# Patient Record
Sex: MALE | Race: WHITE | NOT HISPANIC OR LATINO | Employment: FULL TIME | URBAN - NONMETROPOLITAN AREA
[De-identification: names, ages, dates, MRNs, and addresses within clinical notes are randomized per-mention and may not be internally consistent; named-entity substitution may affect disease eponyms.]

---

## 2018-03-12 ENCOUNTER — APPOINTMENT (OUTPATIENT)
Dept: LAB | Facility: HOSPITAL | Age: 23
End: 2018-03-12

## 2018-03-12 PROCEDURE — 80053 COMPREHEN METABOLIC PANEL: CPT | Performed by: EMERGENCY MEDICINE

## 2018-03-12 PROCEDURE — 85025 COMPLETE CBC W/AUTO DIFF WBC: CPT | Performed by: EMERGENCY MEDICINE

## 2018-03-12 PROCEDURE — 86677 HELICOBACTER PYLORI ANTIBODY: CPT | Performed by: EMERGENCY MEDICINE

## 2018-03-13 ENCOUNTER — OFFICE VISIT (OUTPATIENT)
Dept: GASTROENTEROLOGY | Facility: CLINIC | Age: 23
End: 2018-03-13

## 2018-03-13 VITALS
HEART RATE: 50 BPM | BODY MASS INDEX: 22 KG/M2 | HEIGHT: 72 IN | DIASTOLIC BLOOD PRESSURE: 76 MMHG | SYSTOLIC BLOOD PRESSURE: 126 MMHG | WEIGHT: 162.4 LBS

## 2018-03-13 DIAGNOSIS — R10.13 EPIGASTRIC PAIN: Primary | ICD-10-CM

## 2018-03-13 DIAGNOSIS — R63.4 WEIGHT LOSS, ABNORMAL: ICD-10-CM

## 2018-03-13 PROCEDURE — 99214 OFFICE O/P EST MOD 30 MIN: CPT | Performed by: NURSE PRACTITIONER

## 2018-03-13 RX ORDER — SUCRALFATE 1 G/1
1 TABLET ORAL 4 TIMES DAILY
Qty: 120 TABLET | Refills: 3 | Status: SHIPPED | OUTPATIENT
Start: 2018-03-13 | End: 2018-03-19

## 2018-03-13 RX ORDER — DEXTROSE AND SODIUM CHLORIDE 5; .45 G/100ML; G/100ML
30 INJECTION, SOLUTION INTRAVENOUS CONTINUOUS PRN
Status: CANCELLED | OUTPATIENT
Start: 2018-03-15

## 2018-03-13 NOTE — PROGRESS NOTES
Chief Complaint   Patient presents with   • Abdominal Pain       Subjective    Aldair Rivera is a 22 y.o. male. he is here today for Urgent care follow-up.    Abdominal Pain   This is a new problem. The current episode started more than 1 month ago (one year). The problem occurs daily. The pain is severe. The quality of the pain is cramping and burning. The abdominal pain radiates to the epigastric region. Pertinent negatives include no arthralgias, constipation, diarrhea, fever, nausea or vomiting. The pain is aggravated by eating.     22-year-old male presents to discuss severe epigastric abdominal pain that is been ongoing for the last year.  States he has severe pain anytime he eats no matter what intake is.  He reports he has an appetite and feels hungry however will not eat due to pain associated with eating.  He has lost 40 pounds in the last year.  He denies any nausea, vomiting.  Denies any changes in his bowel habits or blood in the stool.  Denies any family history of colorectal cancer or polyps.  He was seen at urgent care yesterday per Dr. Lacey CBC and CMP were within normal limits and Prilosec was started.  Plan; we'll order ultrasound of abdomen.  Schedule patient for urgent EGD due to severe epigastric pain with eating.  He was started on Prilosec yesterday encouraged him to continue that medication and will add Carafate before meals and at bedtime.  Avoid all gastric irritants.       The following portions of the patient's history were reviewed and updated as appropriate:   Past Medical History:   Diagnosis Date   • Backache    • Neck pain      Past Surgical History:   Procedure Laterality Date   • DENTAL PROCEDURE     • INJECTION OF MEDICATION  04/07/2016    Toradol (1)        Family History   Problem Relation Age of Onset   • Heart disease Mother    • Hypertension Mother    • Stroke Maternal Aunt    • Diabetes Maternal Grandmother        Current Outpatient Prescriptions   Medication Sig Dispense  "Refill   • omeprazole (priLOSEC) 40 MG capsule Take 1 capsule by mouth Daily for 30 days. 30 capsule 0   • sucralfate (CARAFATE) 1 g tablet Take 1 tablet by mouth 4 (Four) Times a Day. 120 tablet 3     No current facility-administered medications for this visit.      Allergies   Allergen Reactions   • Zithromax [Azithromycin] Swelling     Social History     Social History   • Marital status: Single     Social History Main Topics   • Smoking status: Never Smoker   • Smokeless tobacco: Current User     Types: Chew   • Alcohol use No   • Drug use: No   • Sexual activity: Defer     Other Topics Concern   • Not on file       Review of Systems  Review of Systems   Constitutional: Negative for activity change, appetite change, chills, diaphoresis, fatigue, fever and unexpected weight change.   HENT: Negative for sore throat and trouble swallowing.    Respiratory: Negative for shortness of breath.    Gastrointestinal: Positive for abdominal pain. Negative for abdominal distention, anal bleeding, blood in stool, constipation, diarrhea, nausea, rectal pain and vomiting.   Musculoskeletal: Negative for arthralgias.   Skin: Negative for pallor.   Neurological: Negative for light-headedness.        /76   Pulse 50   Ht 182.9 cm (72.01\")   Wt 73.7 kg (162 lb 6.4 oz)   BMI 22.02 kg/m²     Objective    Physical Exam   Constitutional: He is oriented to person, place, and time. He appears well-developed and well-nourished. He is cooperative. No distress.   HENT:   Head: Normocephalic and atraumatic.   Neck: Normal range of motion. Neck supple. No thyromegaly present.   Cardiovascular: Normal rate, regular rhythm and normal heart sounds.    Pulmonary/Chest: Effort normal and breath sounds normal. He has no wheezes. He has no rhonchi. He has no rales.   Abdominal: Soft. Normal appearance and bowel sounds are normal. He exhibits no shifting dullness and no distension. There is no hepatosplenomegaly. There is tenderness in the " epigastric area. There is no rigidity and no guarding. No hernia.   Lymphadenopathy:     He has no cervical adenopathy.   Neurological: He is alert and oriented to person, place, and time.   Skin: Skin is warm, dry and intact. No rash noted. No pallor.   Psychiatric: He has a normal mood and affect. His speech is normal.     Admission on 03/12/2018, Discharged on 03/12/2018   Component Date Value Ref Range Status   • Glucose 03/12/2018 85  60 - 100 mg/dL Final   • BUN 03/12/2018 9  7 - 21 mg/dL Final   • Creatinine 03/12/2018 0.81  0.70 - 1.30 mg/dL Final   • Sodium 03/12/2018 141  137 - 145 mmol/L Final   • Potassium 03/12/2018 4.5  3.5 - 5.1 mmol/L Final   • Chloride 03/12/2018 99  95 - 110 mmol/L Final   • CO2 03/12/2018 29.0  22.0 - 31.0 mmol/L Final   • Calcium 03/12/2018 10.0  8.4 - 10.2 mg/dL Final   • Total Protein 03/12/2018 7.9  6.3 - 8.6 g/dL Final   • Albumin 03/12/2018 4.70  3.40 - 4.80 g/dL Final   • ALT (SGPT) 03/12/2018 46  21 - 72 U/L Final   • AST (SGOT) 03/12/2018 27  17 - 59 U/L Final   • Alkaline Phosphatase 03/12/2018 69  38 - 126 U/L Final   • Total Bilirubin 03/12/2018 0.6  0.2 - 1.3 mg/dL Final   • eGFR Non African Amer 03/12/2018 120  >60 mL/min/1.73 Final   • Globulin 03/12/2018 3.2  2.3 - 3.5 gm/dL Final   • A/G Ratio 03/12/2018 1.5  1.1 - 1.8 g/dL Final   • BUN/Creatinine Ratio 03/12/2018 11.1  7.0 - 25.0 Final   • Anion Gap 03/12/2018 13.0  5.0 - 15.0 mmol/L Final   • H. pylori IgG 03/13/2018 0.16  0.00 - 0.79 Index Value Final   • H. pylori, IgA ABS 03/13/2018 <9.0  0.0 - 8.9 units Final   • H. Pylori, IgM 03/13/2018 <9.0  0.0 - 8.9 units Final   • WBC 03/12/2018 4.88  3.20 - 9.80 10*3/mm3 Final   • RBC 03/12/2018 5.17  4.37 - 5.74 10*6/mm3 Final   • Hemoglobin 03/12/2018 15.3  13.7 - 17.3 g/dL Final   • Hematocrit 03/12/2018 43.5  39.0 - 49.0 % Final   • MCV 03/12/2018 84.1  80.0 - 98.0 fL Final   • MCH 03/12/2018 29.6  26.5 - 34.0 pg Final   • MCHC 03/12/2018 35.2  31.5 - 36.3 g/dL  Final   • RDW 03/12/2018 12.0  11.5 - 14.5 % Final   • RDW-SD 03/12/2018 37.0  35.1 - 43.9 fl Final   • MPV 03/12/2018 9.9  8.0 - 12.0 fL Final   • Platelets 03/12/2018 265  150 - 450 10*3/mm3 Final   • Neutrophil % 03/12/2018 46.3  37.0 - 80.0 % Final   • Lymphocyte % 03/12/2018 44.1  10.0 - 50.0 % Final   • Monocyte % 03/12/2018 7.4  0.0 - 12.0 % Final   • Eosinophil % 03/12/2018 1.6  0.0 - 7.0 % Final   • Basophil % 03/12/2018 0.6  0.0 - 2.0 % Final   • Immature Grans % 03/12/2018 0.0  0.0 - 0.5 % Final   • Neutrophils, Absolute 03/12/2018 2.26  2.00 - 8.60 10*3/mm3 Final   • Lymphocytes, Absolute 03/12/2018 2.15  0.60 - 4.20 10*3/mm3 Final   • Monocytes, Absolute 03/12/2018 0.36  0.00 - 0.90 10*3/mm3 Final   • Eosinophils, Absolute 03/12/2018 0.08  0.00 - 0.70 10*3/mm3 Final   • Basophils, Absolute 03/12/2018 0.03  0.00 - 0.20 10*3/mm3 Final   • Immature Grans, Absolute 03/12/2018 0.00  0.00 - 0.02 10*3/mm3 Final     Assessment/Plan      1. Epigastric pain    2. Weight loss, abnormal    .       Orders placed during this encounter include:  Orders Placed This Encounter   Procedures   • US Gallbladder     Order Specific Question:   Reason for Exam:     Answer:   abd pain weight loss       ESOPHAGOGASTRODUODENOSCOPY possible dilation (N/A)    Review and/or summary of lab tests, radiology, procedures, medications. Review and summary of old records and obtaining of history. The risks and benefits of my recommendations, as well as other treatment options were discussed with the patient today. Questions were answered.    New Medications Ordered This Visit   Medications   • sucralfate (CARAFATE) 1 g tablet     Sig: Take 1 tablet by mouth 4 (Four) Times a Day.     Dispense:  120 tablet     Refill:  3       Follow-up: Return in about 4 weeks (around 4/10/2018).          This document has been electronically signed by MERLINE Nelson on March 13, 2018 4:41 PM             Results for orders placed or performed during the  hospital encounter of 03/12/18   Helicobacter Pylori, IgA IgG IgM   Result Value Ref Range    H. pylori IgG 0.16 0.00 - 0.79 Index Value    H. pylori, IgA ABS <9.0 0.0 - 8.9 units    H. Pylori, IgM <9.0 0.0 - 8.9 units   CBC Auto Differential   Result Value Ref Range    WBC 4.88 3.20 - 9.80 10*3/mm3    RBC 5.17 4.37 - 5.74 10*6/mm3    Hemoglobin 15.3 13.7 - 17.3 g/dL    Hematocrit 43.5 39.0 - 49.0 %    MCV 84.1 80.0 - 98.0 fL    MCH 29.6 26.5 - 34.0 pg    MCHC 35.2 31.5 - 36.3 g/dL    RDW 12.0 11.5 - 14.5 %    RDW-SD 37.0 35.1 - 43.9 fl    MPV 9.9 8.0 - 12.0 fL    Platelets 265 150 - 450 10*3/mm3    Neutrophil % 46.3 37.0 - 80.0 %    Lymphocyte % 44.1 10.0 - 50.0 %    Monocyte % 7.4 0.0 - 12.0 %    Eosinophil % 1.6 0.0 - 7.0 %    Basophil % 0.6 0.0 - 2.0 %    Immature Grans % 0.0 0.0 - 0.5 %    Neutrophils, Absolute 2.26 2.00 - 8.60 10*3/mm3    Lymphocytes, Absolute 2.15 0.60 - 4.20 10*3/mm3    Monocytes, Absolute 0.36 0.00 - 0.90 10*3/mm3    Eosinophils, Absolute 0.08 0.00 - 0.70 10*3/mm3    Basophils, Absolute 0.03 0.00 - 0.20 10*3/mm3    Immature Grans, Absolute 0.00 0.00 - 0.02 10*3/mm3   Comprehensive Metabolic Panel   Result Value Ref Range    Glucose 85 60 - 100 mg/dL    BUN 9 7 - 21 mg/dL    Creatinine 0.81 0.70 - 1.30 mg/dL    Sodium 141 137 - 145 mmol/L    Potassium 4.5 3.5 - 5.1 mmol/L    Chloride 99 95 - 110 mmol/L    CO2 29.0 22.0 - 31.0 mmol/L    Calcium 10.0 8.4 - 10.2 mg/dL    Total Protein 7.9 6.3 - 8.6 g/dL    Albumin 4.70 3.40 - 4.80 g/dL    ALT (SGPT) 46 21 - 72 U/L    AST (SGOT) 27 17 - 59 U/L    Alkaline Phosphatase 69 38 - 126 U/L    Total Bilirubin 0.6 0.2 - 1.3 mg/dL    eGFR Non African Amer 120 >60 mL/min/1.73    Globulin 3.2 2.3 - 3.5 gm/dL    A/G Ratio 1.5 1.1 - 1.8 g/dL    BUN/Creatinine Ratio 11.1 7.0 - 25.0    Anion Gap 13.0 5.0 - 15.0 mmol/L   Results for orders placed or performed during the hospital encounter of 01/16/17   Urinalysis   Result Value Ref Range    Color, UA YELLOW  STRAW,YELLOW,DK YELLOW,JUANI    Appearance CLOUDY (H) CLEAR    Specific Gravity, UA 1.011 1.003 - 1.030    pH, UA 7.5 5.0 - 9.0 pH Units    Leukocytes, UA NEGATIVE NEGATIVE    Nitrite, UA NEGATIVE NEGATIVE    Protein, UA NEGATIVE NEGATIVE    Glucose, Urine NEGATIVE NEGATIVE mg/dl    Ketones, UA NEGATIVE NEGATIVE    Urobilinogen, UA 0.2 0.2 EU/dl    Blood, UA NEGATIVE NEGATIVE   CBC & Differential   Result Value Ref Range    WBC 12.6 (H) 3.2 - 9.8 x1000/uL    RBC 5.23 4.37 - 5.74 jayla/mm3    Hemoglobin 15.4 13.7 - 17.3 gm/dl    Hematocrit 42.9 39.0 - 49.0 %    MCV 82.0 80.0 - 98.0 fl    MCH 29.4 26.0 - 34.0 pg    MCHC 35.9 31.5 - 36.3 gm/dl    RDW 12.0 11.5 - 14.5 %    Platelets 319 150 - 450 x1000/mm3    MPV 9.1 8.0 - 12.0 fl    Neutrophil Rel % 76.7 37.0 - 80.0 %    Lymphocyte Rel % 16.8 10.0 - 50.0 %    Monocyte Rel % 5.5 0.0 - 12.0 %    Eosinophil Rel % 0.6 0.0 - 7.0 %    Basophil Rel % 0.2 0.0 - 2.0 %    Immature Granulocyte Rel % 0.20 0.00 - 0.50 %    Neutrophils Absolute 9.69 (H) 2.00 - 8.60 x1000/uL    Lymphocytes Absolute 2.12 0.60 - 4.20 x1000/uL    Monocytes Absolute 0.69 0.00 - 0.90 x1000/uL    Eosinophils Absolute 0.07 0.00 - 0.70 x1000/uL    Basophils Absolute 0.02 0.00 - 0.20 x1000/uL    Immature Granulocytes Absolute 0.030 (H) 0.005 - 0.022 x1000/uL    nRBC 0.0 0.0 - 0.2 %    nRBC 0.000 x1000/uL   CK   Result Value Ref Range    Creatine Kinase 61 55 - 170 U/L   Comprehensive Metabolic Panel   Result Value Ref Range    Sodium 138 137 - 145 mmol/L    Potassium 3.8 3.5 - 5.1 mmol/L    Chloride 100 95 - 110 mmol/L    CO2 25 22 - 31 mmol/L    Anion Gap 13.0 5.0 - 15.0 mmol/L    Glucose 96 60 - 100 mg/dl    BUN 9 7 - 21 mg/dl    Creatinine 0.8 0.7 - 1.3 mg/dl    GFR MDRD Non  123 77 - 179 mL/min/1.73 sq.M    GFR MDRD  149 77 - 179 mL/min/1.73 sq.M    Calcium 9.6 8.4 - 10.2 mg/dl    Total Protein 7.5 6.3 - 8.6 gm/dl    Albumin 4.3 3.4 - 4.8 gm/dl    Total Bilirubin 0.5 0.2 -  1.3 mg/dl    Alkaline Phosphatase 77 38 - 126 U/L    AST (SGOT) 21 17 - 59 U/L    ALT (SGPT) 42 21 - 72 U/L   Results for orders placed or performed during the hospital encounter of 01/14/17   proBNP   Result Value Ref Range    NT-proBNP 29 0 - 450 pg/ml   CK-MB   Result Value Ref Range    CKMB <0.2 0.0 - 5.0 ng/ml     *Note: Due to a large number of results and/or encounters for the requested time period, some results have not been displayed. A complete set of results can be found in Results Review.

## 2018-03-14 ENCOUNTER — TELEPHONE (OUTPATIENT)
Dept: GASTROENTEROLOGY | Facility: CLINIC | Age: 23
End: 2018-03-14

## 2018-03-14 NOTE — TELEPHONE ENCOUNTER
Attempted to call patients' mother back voicemail was not set up at the moment therefore I couldn't leave a message.

## 2018-03-14 NOTE — TELEPHONE ENCOUNTER
----- Message from Sissy Montejo MA sent at 3/14/2018  1:46 PM CDT -----  Contact: [phone]  Queenie Rivera patients mother called stating that the Rx that was sent in yesterday is not available at the pharmacy.

## 2018-03-15 ENCOUNTER — ANESTHESIA EVENT (OUTPATIENT)
Dept: GASTROENTEROLOGY | Facility: HOSPITAL | Age: 23
End: 2018-03-15

## 2018-03-15 ENCOUNTER — ANESTHESIA (OUTPATIENT)
Dept: GASTROENTEROLOGY | Facility: HOSPITAL | Age: 23
End: 2018-03-15

## 2018-03-15 ENCOUNTER — HOSPITAL ENCOUNTER (OUTPATIENT)
Facility: HOSPITAL | Age: 23
Setting detail: HOSPITAL OUTPATIENT SURGERY
Discharge: HOME | End: 2018-03-15
Attending: INTERNAL MEDICINE | Admitting: INTERNAL MEDICINE

## 2018-03-15 VITALS
DIASTOLIC BLOOD PRESSURE: 62 MMHG | HEIGHT: 72 IN | SYSTOLIC BLOOD PRESSURE: 117 MMHG | BODY MASS INDEX: 21.81 KG/M2 | TEMPERATURE: 97.7 F | WEIGHT: 161 LBS | HEART RATE: 70 BPM | RESPIRATION RATE: 17 BRPM | OXYGEN SATURATION: 99 %

## 2018-03-15 DIAGNOSIS — R10.13 EPIGASTRIC PAIN: ICD-10-CM

## 2018-03-15 DIAGNOSIS — R63.4 WEIGHT LOSS, ABNORMAL: ICD-10-CM

## 2018-03-15 PROCEDURE — 43239 EGD BIOPSY SINGLE/MULTIPLE: CPT | Performed by: INTERNAL MEDICINE

## 2018-03-15 PROCEDURE — 88305 TISSUE EXAM BY PATHOLOGIST: CPT | Performed by: PATHOLOGY

## 2018-03-15 PROCEDURE — 88305 TISSUE EXAM BY PATHOLOGIST: CPT | Performed by: INTERNAL MEDICINE

## 2018-03-15 PROCEDURE — 25010000002 PROPOFOL 10 MG/ML EMULSION: Performed by: NURSE ANESTHETIST, CERTIFIED REGISTERED

## 2018-03-15 RX ORDER — DEXTROSE AND SODIUM CHLORIDE 5; .45 G/100ML; G/100ML
30 INJECTION, SOLUTION INTRAVENOUS CONTINUOUS PRN
Status: DISCONTINUED | OUTPATIENT
Start: 2018-03-15 | End: 2018-03-15 | Stop reason: HOSPADM

## 2018-03-15 RX ORDER — PROPOFOL 10 MG/ML
VIAL (ML) INTRAVENOUS AS NEEDED
Status: DISCONTINUED | OUTPATIENT
Start: 2018-03-15 | End: 2018-03-15 | Stop reason: SURG

## 2018-03-15 RX ORDER — ONDANSETRON 2 MG/ML
4 INJECTION INTRAMUSCULAR; INTRAVENOUS ONCE AS NEEDED
Status: DISCONTINUED | OUTPATIENT
Start: 2018-03-15 | End: 2018-03-15 | Stop reason: HOSPADM

## 2018-03-15 RX ORDER — DEXAMETHASONE SODIUM PHOSPHATE 4 MG/ML
8 INJECTION, SOLUTION INTRA-ARTICULAR; INTRALESIONAL; INTRAMUSCULAR; INTRAVENOUS; SOFT TISSUE ONCE AS NEEDED
Status: DISCONTINUED | OUTPATIENT
Start: 2018-03-15 | End: 2018-03-15 | Stop reason: HOSPADM

## 2018-03-15 RX ORDER — PROMETHAZINE HYDROCHLORIDE 25 MG/1
25 TABLET ORAL ONCE AS NEEDED
Status: DISCONTINUED | OUTPATIENT
Start: 2018-03-15 | End: 2018-03-15 | Stop reason: HOSPADM

## 2018-03-15 RX ORDER — LIDOCAINE HYDROCHLORIDE 10 MG/ML
INJECTION, SOLUTION INFILTRATION; PERINEURAL AS NEEDED
Status: DISCONTINUED | OUTPATIENT
Start: 2018-03-15 | End: 2018-03-15 | Stop reason: SURG

## 2018-03-15 RX ORDER — PROMETHAZINE HYDROCHLORIDE 25 MG/ML
12.5 INJECTION, SOLUTION INTRAMUSCULAR; INTRAVENOUS ONCE AS NEEDED
Status: DISCONTINUED | OUTPATIENT
Start: 2018-03-15 | End: 2018-03-15 | Stop reason: HOSPADM

## 2018-03-15 RX ORDER — PROMETHAZINE HYDROCHLORIDE 25 MG/1
25 SUPPOSITORY RECTAL ONCE AS NEEDED
Status: DISCONTINUED | OUTPATIENT
Start: 2018-03-15 | End: 2018-03-15 | Stop reason: HOSPADM

## 2018-03-15 RX ADMIN — PROPOFOL 50 MG: 10 INJECTION, EMULSION INTRAVENOUS at 18:06

## 2018-03-15 RX ADMIN — DEXTROSE AND SODIUM CHLORIDE 30 ML/HR: 5; 450 INJECTION, SOLUTION INTRAVENOUS at 16:32

## 2018-03-15 RX ADMIN — LIDOCAINE HYDROCHLORIDE 50 MG: 10 INJECTION, SOLUTION INFILTRATION; PERINEURAL at 17:55

## 2018-03-15 RX ADMIN — PROPOFOL 150 MG: 10 INJECTION, EMULSION INTRAVENOUS at 17:58

## 2018-03-15 NOTE — ANESTHESIA POSTPROCEDURE EVALUATION
Patient: Aldair Rivera    Procedure Summary     Date:  03/15/18 Room / Location:  [facility] ENDOSCOPY 2 / [facility] ENDOSCOPY    Anesthesia Start:  1756 Anesthesia Stop:  1810    Procedure:  ESOPHAGOGASTRODUODENOSCOPY possible dilation (N/A Esophagus) Diagnosis:       Epigastric pain      Weight loss, abnormal      (Epigastric pain [R10.13])      (Weight loss, abnormal [R63.4])    Surgeon:  Roman Boyle MD Provider:  Jacinto South CRNA    Anesthesia Type:  MAC ASA Status:  1          Anesthesia Type: MAC  Last vitals  BP   134/63 (03/15/18 1626)   Temp   97.6 °F (36.4 °C) (03/15/18 1626)   Pulse   69 (03/15/18 1626)   Resp   18 (03/15/18 1626)     SpO2   98 % (03/15/18 1626)     Post Anesthesia Care and Evaluation    Patient location during evaluation: bedside  Patient participation: complete - patient participated  Level of consciousness: awake  Pain score: 1  Pain management: adequate  Airway patency: patent  Anesthetic complications: No anesthetic complications  PONV Status: none  Cardiovascular status: acceptable  Hydration status: acceptable

## 2018-03-15 NOTE — ANESTHESIA PREPROCEDURE EVALUATION
Anesthesia Evaluation     Patient summary reviewed and Nursing notes reviewed   NPO Solid Status: > 8 hours  NPO Liquid Status: > 8 hours           Airway   No difficulty expected  Dental      Pulmonary    Cardiovascular         Neuro/Psych  GI/Hepatic/Renal/Endo      Musculoskeletal     (+) neck pain,   Abdominal    Substance History      OB/GYN          Other                        Anesthesia Plan    ASA 1     MAC     intravenous induction   Anesthetic plan and risks discussed with patient.    Plan discussed with CRNA.

## 2018-03-19 ENCOUNTER — HOSPITAL ENCOUNTER (OUTPATIENT)
Dept: ULTRASOUND IMAGING | Facility: HOSPITAL | Age: 23
Discharge: HOME | End: 2018-03-19
Admitting: NURSE PRACTITIONER

## 2018-03-19 ENCOUNTER — LAB (OUTPATIENT)
Dept: LAB | Facility: HOSPITAL | Age: 23
End: 2018-03-19

## 2018-03-19 ENCOUNTER — TELEPHONE (OUTPATIENT)
Dept: GASTROENTEROLOGY | Facility: CLINIC | Age: 23
End: 2018-03-19

## 2018-03-19 ENCOUNTER — OFFICE VISIT (OUTPATIENT)
Dept: FAMILY MEDICINE CLINIC | Facility: CLINIC | Age: 23
End: 2018-03-19

## 2018-03-19 VITALS
HEIGHT: 72 IN | WEIGHT: 159 LBS | BODY MASS INDEX: 21.54 KG/M2 | SYSTOLIC BLOOD PRESSURE: 148 MMHG | HEART RATE: 81 BPM | OXYGEN SATURATION: 95 % | DIASTOLIC BLOOD PRESSURE: 72 MMHG

## 2018-03-19 DIAGNOSIS — Z23 NEEDS FLU SHOT: ICD-10-CM

## 2018-03-19 DIAGNOSIS — F41.9 ANXIETY AND DEPRESSION: ICD-10-CM

## 2018-03-19 DIAGNOSIS — F32.A ANXIETY AND DEPRESSION: ICD-10-CM

## 2018-03-19 DIAGNOSIS — R10.84 GENERALIZED ABDOMINAL PAIN: Primary | ICD-10-CM

## 2018-03-19 DIAGNOSIS — R10.13 EPIGASTRIC PAIN: ICD-10-CM

## 2018-03-19 DIAGNOSIS — R19.4 CHANGE IN BOWEL HABITS: ICD-10-CM

## 2018-03-19 DIAGNOSIS — R07.9 CHEST PAIN, UNSPECIFIED TYPE: Primary | ICD-10-CM

## 2018-03-19 DIAGNOSIS — R10.84 GENERALIZED ABDOMINAL PAIN: ICD-10-CM

## 2018-03-19 DIAGNOSIS — Z23 NEED FOR HPV VACCINATION: ICD-10-CM

## 2018-03-19 LAB
LAB AP CASE REPORT: NORMAL
Lab: NORMAL
PATH REPORT.FINAL DX SPEC: NORMAL
PATH REPORT.GROSS SPEC: NORMAL

## 2018-03-19 PROCEDURE — 86003 ALLG SPEC IGE CRUDE XTRC EA: CPT

## 2018-03-19 PROCEDURE — 83516 IMMUNOASSAY NONANTIBODY: CPT

## 2018-03-19 PROCEDURE — 99213 OFFICE O/P EST LOW 20 MIN: CPT | Performed by: FAMILY MEDICINE

## 2018-03-19 PROCEDURE — 82784 ASSAY IGA/IGD/IGG/IGM EACH: CPT

## 2018-03-19 PROCEDURE — 76705 ECHO EXAM OF ABDOMEN: CPT

## 2018-03-19 PROCEDURE — 36415 COLL VENOUS BLD VENIPUNCTURE: CPT

## 2018-03-19 RX ORDER — SERTRALINE HYDROCHLORIDE 25 MG/1
25 TABLET, FILM COATED ORAL DAILY
Qty: 30 TABLET | Refills: 1 | Status: SHIPPED | OUTPATIENT
Start: 2018-03-19 | End: 2018-05-22 | Stop reason: SDUPTHER

## 2018-03-19 RX ORDER — SUCRALFATE 1 G/1
1 TABLET ORAL 4 TIMES DAILY
Qty: 120 TABLET | Refills: 2 | Status: SHIPPED | OUTPATIENT
Start: 2018-03-19

## 2018-03-19 RX ORDER — HYDROXYZINE PAMOATE 25 MG/1
25 CAPSULE ORAL 3 TIMES DAILY PRN
Qty: 30 CAPSULE | Refills: 1 | Status: SHIPPED | OUTPATIENT
Start: 2018-03-19 | End: 2018-08-25

## 2018-03-19 NOTE — PROGRESS NOTES
Subjective:     Aldair Rivera is a 22 y.o. male who presents for initial evaluation for establishment of care. PHQ9 of 10. Pt is agreeable to TDAP, Influenza and HPV vacciantion. Pt to obtain TDAP at the health department. Pt has been seen by Buffy RICK for 1 year history epigastric pain associated with eating and recently had a endoscopy with biopsies that they plan to follow up on 3/27. Pt was started on prilosec. Pt reports a 7-8 month history of intermittent left sided, nonradiating chest pain. Pt reports nothing makes the pain better or worse. Pt reports a family history of his mother having a MI at 40 years old and maternal granfather at 32 years old. Pt is agreeable to TTE at this time. Pt reports around a 1 year history of increased anxiety/stress. Pt does not report panic attack like symptoms more general constant low levels of anxiety. Pt states he has difficulty falling asleep, decreased interest in hobbies, no guilt, low energy, good concentration, good appetite, no psychomotor, no homicidal/suicidal ideation. Pt is agreeable to starting zoloft and vistaril at this time.    Preventative:  Over the past 2 weeks, have you felt down, depressed, or hopeless?Yes   Over the past 2 weeks, have you felt little interest or pleasure in doing things?Yes  Clinical depression screening refused by patient.No     Past Medical Hx:  Past Medical History:   Diagnosis Date   • Backache    • Neck pain        Past Surgical Hx:  Past Surgical History:   Procedure Laterality Date   • DENTAL PROCEDURE     • ENDOSCOPY N/A 3/15/2018    Procedure: ESOPHAGOGASTRODUODENOSCOPY possible dilation;  Surgeon: Roman Boyle MD;  Location: [facility] ENDOSCOPY;  Service: Gastroenterology   • INJECTION OF MEDICATION  04/07/2016    Toradol (1)          Health Maintenance:  Health Maintenance   Topic Date Due   • HPV VACCINES (1 of 3 - Male 3 Dose Series) 12/29/2007   • TDAP/TD VACCINES (1 - Tdap) 12/29/2015   • INFLUENZA VACCINE   08/01/2017       Current Meds:    Current Outpatient Prescriptions:   •  omeprazole (priLOSEC) 40 MG capsule, Take 1 capsule by mouth Daily for 30 days., Disp: 30 capsule, Rfl: 0  •  hydrOXYzine (VISTARIL) 25 MG capsule, Take 1 capsule by mouth 3 (Three) Times a Day As Needed for Itching., Disp: 30 capsule, Rfl: 1  •  sertraline (ZOLOFT) 25 MG tablet, Take 1 tablet by mouth Daily., Disp: 30 tablet, Rfl: 1  •  sucralfate (CARAFATE) 1 g tablet, Take 1 tablet by mouth 4 (Four) Times a Day., Disp: 120 tablet, Rfl: 2    Allergies:  Zithromax [azithromycin]    Family Hx:  Family History   Problem Relation Age of Onset   • Heart disease Mother    • Hypertension Mother    • Stroke Maternal Aunt    • Diabetes Maternal Grandmother         Social History:  Social History     Social History   • Marital status: Single     Spouse name: N/A   • Number of children: N/A   • Years of education: N/A     Occupational History   • Not on file.     Social History Main Topics   • Smoking status: Former Smoker   • Smokeless tobacco: Current User     Types: Chew   • Alcohol use No   • Drug use: No   • Sexual activity: Defer     Other Topics Concern   • Not on file     Social History Narrative   • No narrative on file       Review of Systems  Review of Systems   Constitutional: Negative for chills and fever.   HENT: Negative for congestion and sore throat.    Eyes: Negative for photophobia and visual disturbance.   Respiratory: Negative for cough, shortness of breath and wheezing.    Cardiovascular: Positive for chest pain. Negative for palpitations and leg swelling.   Gastrointestinal: Positive for abdominal pain. Negative for diarrhea, nausea and vomiting.   Genitourinary: Negative for dysuria and hematuria.   Musculoskeletal: Negative for neck pain and neck stiffness.   Skin: Negative for rash and wound.   Neurological: Negative for seizures and syncope.   Psychiatric/Behavioral: Positive for dysphoric mood. The patient is nervous/anxious.  "         Objective:     /72 (BP Location: Left arm, Patient Position: Sitting, Cuff Size: Adult)   Pulse 81   Ht 182.9 cm (72\")   Wt 72.1 kg (159 lb)   SpO2 95%   BMI 21.56 kg/m²   Physical Exam   Constitutional: He is oriented to person, place, and time. He appears well-developed and well-nourished. No distress.   HENT:   Head: Normocephalic and atraumatic.   Right Ear: External ear normal.   Left Ear: External ear normal.   Nose: Nose normal.   Eyes: Conjunctivae are normal. Right eye exhibits no discharge. Left eye exhibits no discharge. No scleral icterus.   Neck: Normal range of motion. Neck supple.   Cardiovascular: Normal rate, regular rhythm and normal heart sounds.    No murmur heard.  Pulmonary/Chest: Effort normal and breath sounds normal. No respiratory distress. He has no wheezes. He has no rales.   Abdominal: Soft. Bowel sounds are normal. There is no tenderness.   Musculoskeletal: Normal range of motion. He exhibits no edema.   Neurological: He is alert and oriented to person, place, and time.   Skin: Skin is warm and dry. He is not diaphoretic.   Psychiatric: He has a normal mood and affect. His behavior is normal. Judgment and thought content normal.   Nursing note and vitals reviewed.                                                                     Assessment/Plan:     Diagnoses and all orders for this visit:    Chest pain, unspecified type  -     Adult Transthoracic Echo Complete W/ Cont if Necessary Per Protocol; Future    Anxiety and depression  -     sertraline (ZOLOFT) 25 MG tablet; Take 1 tablet by mouth Daily.  -     hydrOXYzine (VISTARIL) 25 MG capsule; Take 1 capsule by mouth 3 (Three) Times a Day As Needed for Itching.    Epigastric pain    Need for HPV vaccination  -     HPV Vaccine (HPV9)    Needs flu shot  -     Flu Vaccine Quad PF >18YR         Follow-up:     Return in about 4 weeks (around 4/16/2018) for Next scheduled follow up.        GOALS:  Maintain medication " compliance    Preventative:  Male Preventative: Exercises regularly  Vaccines:   Tetanus vaccine: not up to date - will obtain at health department  Annual influenza vaccine: up to date     former smoker  does not drink  eat more fruits and vegetables, decrease soda or juice intake, increase water intake and increase physical activity    RISK SCORE: 3    Mil Santizo MD PGY2  [place] [address]  Office: [phone]      This document has been electronically signed by Mil Santizo MD on March 20, 2018 1:57 PM

## 2018-03-19 NOTE — TELEPHONE ENCOUNTER
I spoke with patients mother,Queenie and relayed the notes from Buffy Acevedo. She is going to find a pharmacy that can get the carafate and then have us send the Prescription.She is also going to tell Aldair to get the lab work asap.       ----- Message from MERLINE Ross sent at 3/19/2018  2:32 PM CDT -----  I dont have another comparable but he could try over the counter gaviscon. Also his biopsies look consistent with celiac sprue/ eosinophilic esophagitis. I can order recurrent GI distress panel and recommend he try gluten free diet.   ----- Message -----  From: Radha Buck  Sent: 3/19/2018   1:40 PM  To: MERLINE Ross    Pt pharmacy cannot get carafate, would like to know what you want them to do?

## 2018-03-20 ENCOUNTER — TELEPHONE (OUTPATIENT)
Dept: GASTROENTEROLOGY | Facility: CLINIC | Age: 23
End: 2018-03-20

## 2018-03-20 NOTE — TELEPHONE ENCOUNTER
Attempted to call the patient per Buffy RICK. Patient phone number didn't give me the options to leave a voicemail. Therefore I sent a letter with ultrasound results.

## 2018-03-20 NOTE — TELEPHONE ENCOUNTER
----- Message from MERLINE Ross sent at 3/19/2018  1:01 PM CDT -----  Please call patient with results

## 2018-03-21 LAB
GLIADIN PEPTIDE IGA SER-ACNC: 3 UNITS (ref 0–19)
GLIADIN PEPTIDE IGA SER-ACNC: 4 UNITS (ref 0–19)
GLIADIN PEPTIDE IGG SER-ACNC: 2 UNITS (ref 0–19)
GLIADIN PEPTIDE IGG SER-ACNC: 2 UNITS (ref 0–19)
IGA SERPL-MCNC: 180 MG/DL (ref 90–386)
TTG IGA SER-ACNC: <2 U/ML (ref 0–3)
TTG IGA SER-ACNC: <2 U/ML (ref 0–3)
TTG IGG SER-ACNC: <2 U/ML (ref 0–5)
TTG IGG SER-ACNC: <2 U/ML (ref 0–5)

## 2018-03-25 LAB
CALIF WALNUT POLN IGE QN: 0.13 KU/L
CODFISH IGE QN: <0.1 KU/L
CONV CLASS DESCRIPTION: ABNORMAL
COW MILK IGE QN: 0.28 KU/L
EGG WHITE IGE QN: <0.1 KU/L
GLUTEN IGE QN: 0.13 KU/L
HAZELNUT IGE QN: <0.1 KU/L
PEANUT IGE QN: 0.28 KU/L
SCALLOP IGE QN: <0.1 KU/L
SESAME SEED IGE: <0.1 KU/L
SHRIMP IGE: <0.1 KU/L
SOYBEAN IGE QN: 0.11 KU/L
WHEAT IGE QN: 0.19 KU/L

## 2018-03-26 ENCOUNTER — TELEPHONE (OUTPATIENT)
Dept: GASTROENTEROLOGY | Facility: CLINIC | Age: 23
End: 2018-03-26

## 2018-03-26 NOTE — TELEPHONE ENCOUNTER
----- Message from MERLINE Ross sent at 3/26/2018  8:43 AM CDT -----  Please let patient know reactions. He should avoid these. Celiac panel was negative. Appointment tomorrow to discuss further.

## 2018-03-26 NOTE — TELEPHONE ENCOUNTER
Spoke with patient per asael RICK. I notified patient of who I was and where I was calling from. Then I notified him of his allergen food results and notified him that he should try to avoid these foods. I then notified patient that his celiac panel was negative. Patient verbalized his understanding and had no further questions at the moment.

## 2018-03-27 ENCOUNTER — OFFICE VISIT (OUTPATIENT)
Dept: GASTROENTEROLOGY | Facility: CLINIC | Age: 23
End: 2018-03-27

## 2018-03-27 VITALS
SYSTOLIC BLOOD PRESSURE: 114 MMHG | HEART RATE: 79 BPM | DIASTOLIC BLOOD PRESSURE: 72 MMHG | BODY MASS INDEX: 21.51 KG/M2 | WEIGHT: 158.8 LBS | HEIGHT: 72 IN

## 2018-03-27 DIAGNOSIS — K90.0 CELIAC DISEASE: Primary | ICD-10-CM

## 2018-03-27 DIAGNOSIS — R10.13 EPIGASTRIC PAIN: ICD-10-CM

## 2018-03-27 PROCEDURE — 99213 OFFICE O/P EST LOW 20 MIN: CPT | Performed by: NURSE PRACTITIONER

## 2018-03-27 NOTE — PATIENT INSTRUCTIONS
Celiac Disease  Celiac disease is an allergy to the protein that is called gluten. When a person with celiac disease eats a food that has gluten in it, his or her natural defense system (immune system) attacks the cells that line the small intestine. Over time, this reaction damages the small intestine and makes the small intestine unable to absorb nutrients from food.  Gluten is found in wheat, rye, and barley and in foods like pasta, pizza, and cereal. Celiac disease is also known as celiac sprue, nontropical sprue, and gluten-sensitive enteropathy.  What are the causes?  This condition is caused by a gene that is passed down through families (inherited).  What increases the risk?  This condition is more likely to develop in people who have a family member with the disease.  What are the signs or symptoms?  Symptoms of this condition include:  · Recurring bloating and pain in the abdomen.  · Gas.  · Long-term (chronic) diarrhea.  · Pale, bad-smelling, greasy, or oily stool.  · Weight loss.  · Missed menstrual periods.  · Weakening bones (osteoporosis).  · Fatigue and weakness.  · Tingling or other signs of nerve damage.  · Depression.  · Poor appetite.  · Rash.  In some cases, there are no symptoms.  How is this diagnosed?  This condition is diagnosed with a physical exam and tests. Tests may include:  · Blood tests to check for nutritional deficiencies.  · Blood tests to look for evidence that the body is attacking cells in the small intestine.  · A test in which a sample of tissue is taken from the small bowel and examined under a microscope (biopsy).  · X-rays of the bowel.  · Stool tests.  · Tests to check for nutrient absorption from the intestine.  How is this treated?  There is no cure for this condition, but it can be managed with a gluten-free diet. Treatment may also involve avoiding dairy foods, such as milk and cheese, because they are hard to digest. Most people who follow a gluten-free diet feel  better and stop having symptoms. The intestine usually heals within 3 months to 2 years.  In a small percentage of people, this condition does not improve on the gluten-free diet. If your condition does not improve, more tests will be done. You will also need to work with a specialist in celiac disease to find the best treatment for you.  Follow these instructions at home:  · Follow instructions from your health care provider about diet.  · Monitor your body's response to the gluten-free diet. Write down any changes in your symptoms and changes in how you feel.  · If you decide to eat outside of the home, prepare your meal ahead of time, or make sure that the place where you are going has gluten-free options.  · Keep all follow-up visits as told by your health care provider. This is important.  · Suggest to family members that they get screened for early signs of the disease.  Contact a health care provider if:  · You continue to have symptoms, even when you are eating a gluten-free diet.  · You have trouble sticking to the gluten-free diet.  · You develop an itchy rash with groups of tiny blisters.  · You develop severe weakness.  · You develop balance problems.  · You develop new symptoms.  This information is not intended to replace advice given to you by your health care provider. Make sure you discuss any questions you have with your health care provider.  Document Released: 12/18/2006 Document Revised: 05/25/2017 Document Reviewed: 04/11/2016  [email or web address] Interactive Patient Education © 2017 [email or web address]    Gluten-Free Diet for Celiac Disease, Adult  The gluten-free diet includes all foods that do not contain gluten. Gluten is a protein that is found in wheat, rye, barley, and some other grains. Following the gluten-free diet is the only treatment for people with celiac disease. It helps to prevent damage to the intestines and improves or eliminates the symptoms of celiac disease.  Following the gluten-free diet  "requires some planning. It can be challenging at first, but it gets easier with time and practice. There are more gluten-free options available today than ever before. If you need help finding gluten-free foods or if you have questions, talk with your diet and nutrition specialist (registered dietitian) or your health care provider.  What do I need to know about a gluten-free diet?  · All fruits, vegetables, and meats are safe to eat and do not contain gluten.  · When grocery shopping, start by shopping in the produce, meat, and dairy sections. These sections are more likely to contain gluten-free foods. Then move to the aisles that contain packaged foods if you need to.  · Read all food labels. Gluten is often added to foods. Always check the ingredient list and look for warnings, such as “may contain gluten.\"  · Talk with your dietitian or health care provider before taking a gluten-free multivitamin or mineral supplement.  · Be aware of gluten-free foods having contact with foods that contain gluten (cross-contamination). This can happen at home and with any processed foods.  ¨ Talk with your health care provider or dietitian about how to reduce the risk of cross-contamination in your home.  ¨ If you have questions about how a food is processed, ask the .  What key words help to identify gluten?  Foods that list any of these key words on the label usually contain gluten:  · Wheat, flour, enriched flour, bromated flour, white flour, durum flour, eloise flour, phosphated flour, self-rising flour, semolina, farina, barley (malt), rye, and oats.  · Starch, dextrin, modified food starch, or cereal.  · Thickening, fillers, or emulsifiers.  · Malt flavoring, malt extract, or malt syrup.  · Hydrolyzed vegetable protein.  In the [place] packaged foods that are gluten-free are required to be labeled “GF.” These foods should be easy to identify and are safe to eat. In the [place] food companies are also required to " list common food allergens, including wheat, on their labels.  Recommended foods  Grains   · Amaranth, bean flours, 100% buckwheat flour, corn, millet, nut flours or nut meals, GF oats, quinoa, rice, sorghum, teff, rice wafers, pure cornmeal tortillas, popcorn, and hot cereals made from cornmeal. Hilliards, rice, wild rice. Some Asian rice noodles or bean noodles. Arrowroot starch, corn bran, corn flour, corn germ, cornmeal, corn starch, potato flour, potato starch flour, and rice bran. Plain, brown, and sweet rice flours. Rice polish, soy flour, and tapioca starch.  Vegetables   · All plain fresh, frozen, and canned vegetables.  Fruits   · All plain fresh, frozen, canned, and dried fruits, and 100% fruit juices.  Meats and other protein foods   · All fresh beef, pork, poultry, fish, seafood, and eggs. Fish canned in water, oil, brine, or vegetable broth. Plain nuts and seeds, peanut butter. Some lunch meat and some frankfurters. Dried beans, dried peas, and lentils.  Dairy   · Fresh plain, dry, evaporated, or condensed milk. Cream, butter, sour cream, whipping cream, and most yogurts. Unprocessed cheese, most processed cheeses, some cottage cheese, some cream cheeses.  Beverages   · Coffee, tea, most herbal teas. Carbonated beverages and some root beers. Wine, sake, and distilled spirits, such as gin, vodka, and whiskey. Most hard ciders.  Fats and oils   · Butter, margarine, vegetable oil, hydrogenated butter, olive oil, shortening, lard, cream, and some mayonnaise. Some commercial salad dressings. Olives.  Sweets and desserts   · Sugar, honey, some syrups, molasses, jelly, and jam. Plain hard candy, marshmallows, and gumdrops. Pure cocoa powder. Plain chocolate. Custard and some pudding mixes. Gelatin desserts, sorbets, frozen ice pops, and sherbet. Cake, cookies, and other desserts prepared with allowed flours. Some commercial ice creams. Cornstarch, tapioca, and rice puddings.  Seasoning and other foods   · Some  canned or frozen soups. Monosodium glutamate (MSG). Cider, rice, and wine vinegar. Baking soda and baking powder. Cream of tartar. Baking and nutritional yeast. Certain soy sauces made without wheat (ask your dietitian about specific brands that are allowed). Nuts, coconut, and chocolate. Salt, pepper, herbs, spices, flavoring extracts, imitation or artificial flavorings, natural flavorings, and food colorings. Some medicines and supplements. Some lip glosses and other cosmetics. Rice syrups.  The items listed may not be a complete list. Talk with your dietitian about what dietary choices are best for you.  Foods to avoid  Grains   · Barley, bran, bulgur, couscous, cracked wheat, Nye, farro, eloise, malt, matzo, semolina, wheat germ, and all wheat and rye cereals including spelt and kamut. Cereals containing malt as a flavoring, such as rice cereal. Noodles, spaghetti, macaroni, most packaged rice mixes, and all mixes containing wheat, rye, barley, or triticale.  Vegetables   · Most creamed vegetables and most vegetables canned in sauces. Some commercially prepared vegetables and salads.  Fruits   · Thickened or prepared fruits and some pie fillings. Some fruit snacks and fruit roll-ups.  Meats and other protein foods   · Any meat or meat alternative containing wheat, rye, barley, or gluten stabilizers. These are often marinated or packaged meats and lunch meats. Bread-containing products, such as Swiss steak, croquettes, meatballs, and meatloaf. Most tuna canned in vegetable broth and turkey with hydrolyzed vegetable protein (HVP) injected as part of the basting. Seitan. Imitation fish. Eggs in sauces made from ingredients to avoid.  Dairy   · Commercial chocolate milk drinks and malted milk. Some non-dairy creamers. Any cheese product containing ingredients to avoid.  Beverages   · Certain cereal beverages. Beer, maricruz, malted milk, and some root beers. Some hard ciders. Some instant flavored coffees. Some herbal  "teas made with barley or with barley malt added.  Fats and oils   · Some commercial salad dressings. Sour cream containing modified food starch.  Sweets and desserts   · Some toffees. Chocolate-coated nuts (may be rolled in wheat flour) and some commercial candies and candy bars. Most cakes, cookies, donuts, pastries, and other baked goods. Some commercial ice cream. Ice cream cones. Commercially prepared mixes for cakes, cookies, and other desserts. Bread pudding and other puddings thickened with flour. Products containing brown rice syrup made with barley malt enzyme. Desserts and sweets made with malt flavoring.  Seasoning and other foods   · Some casanova powders, some dry seasoning mixes, some gravy extracts, some meat sauces, some ketchups, some prepared mustards, and horseradish. Certain soy sauces. Malt vinegar. Bouillon and bouillon cubes that contain HVP. Some chip dips, and some chewing gum. Yeast extract. Ramos’s yeast. Caramel color. Some medicines and supplements. Some lip glosses and other cosmetics.  The items listed may not be a complete list. Talk with your dietitian about what dietary choices are best for you.  Summary  · Gluten is a protein that is found in wheat, rye, barley, and some other grains. The gluten-free diet includes all foods that do not contain gluten.  · If you need help finding gluten-free foods or if you have questions, talk with your diet and nutrition specialist (registered dietitian) or your health care provider.  · Read all food labels. Gluten is often added to foods. Always check the ingredient list and look for warnings, such as “may contain gluten.\"  This information is not intended to replace advice given to you by your health care provider. Make sure you discuss any questions you have with your health care provider.  Document Released: 12/18/2006 Document Revised: 10/02/2017 Document Reviewed: 10/02/2017  [employer] Interactive Patient Education © 2017 [employer]    "

## 2018-03-27 NOTE — PROGRESS NOTES
Chief Complaint   Patient presents with   • EGD     results       Subjective    Aldair Rivera is a 22 y.o. male. he is here today for follow-up.    History of Present Illness  22-year-old male presents for EGD follow-up. He was last seen 3/13/18 due to Ongoing epigastric pain and abnormal weight loss of 40 pounds in the last year.  States appetite is been about the same he reports he wants to eat however anytime he eats he had severe abdominal cramping and pain and will generally stop a few bites into the meal.   States his bowel movements are normal about 2-3 times per day described as normal consistency with no blood or mucus within the stool.  He rates pain is a 6 and describes it as constant.  EGD noted mildly severe esophagitis, gastritis and normal duodenum.  Biopsy of the small bowel noted March type I lesions, antrum biopsy noted reactive gastropathy and distal biopsy distal esophagus biopsy noted chronic esophagitis with increased intraepithelial eosinophils.  Reviewed these results on the phone and ordered recurrent GI distress panel which had multiple reactants including celiac and wheat.  Plan; recommended patient follow-up with a dietitian and follow a gluten-free diet.  Follow-up in one month return to office sooner if needed.  He will continue prep Prilosec and Carafate for esophagitis and gastritis.  We will to referral to nutrition services Center for further dietary instructions regarding gluten-free diet.       The following portions of the patient's history were reviewed and updated as appropriate:   Past Medical History:   Diagnosis Date   • Backache    • Neck pain      Past Surgical History:   Procedure Laterality Date   • DENTAL PROCEDURE     • ENDOSCOPY N/A 3/15/2018    Procedure: ESOPHAGOGASTRODUODENOSCOPY possible dilation;  Surgeon: Roman Boyle MD;  Location: [facility] ENDOSCOPY;  Service: Gastroenterology   • INJECTION OF MEDICATION  04/07/2016    Toradol (1)        Family History  "  Problem Relation Age of Onset   • Heart disease Mother    • Hypertension Mother    • Stroke Maternal Aunt    • Diabetes Maternal Grandmother        Current Outpatient Prescriptions   Medication Sig Dispense Refill   • hydrOXYzine (VISTARIL) 25 MG capsule Take 1 capsule by mouth 3 (Three) Times a Day As Needed for Itching. 30 capsule 1   • omeprazole (priLOSEC) 40 MG capsule Take 1 capsule by mouth Daily for 30 days. 30 capsule 0   • sertraline (ZOLOFT) 25 MG tablet Take 1 tablet by mouth Daily. 30 tablet 1   • sucralfate (CARAFATE) 1 g tablet Take 1 tablet by mouth 4 (Four) Times a Day. 120 tablet 2     No current facility-administered medications for this visit.      Allergies   Allergen Reactions   • Zithromax [Azithromycin] Swelling     Social History     Social History   • Marital status: Single     Social History Main Topics   • Smoking status: Former Smoker   • Smokeless tobacco: Current User     Types: Chew   • Alcohol use No   • Drug use: No   • Sexual activity: Defer     Other Topics Concern   • Not on file       Review of Systems  Review of Systems   Constitutional: Positive for unexpected weight change (down 4 more pounds (44 total in one year ). Negative for activity change, appetite change, chills, diaphoresis, fatigue and fever.   HENT: Negative for sore throat and trouble swallowing.    Respiratory: Negative for shortness of breath.    Gastrointestinal: Positive for abdominal pain. Negative for abdominal distention, anal bleeding, blood in stool, constipation, diarrhea, nausea, rectal pain and vomiting.   Musculoskeletal: Negative for arthralgias.   Skin: Negative for pallor.   Neurological: Negative for light-headedness.        /72   Pulse 79   Ht 182.9 cm (72.01\")   Wt 72 kg (158 lb 12.8 oz)   BMI 21.53 kg/m²     Objective    Physical Exam   Constitutional: He is oriented to person, place, and time. He appears well-developed and well-nourished. He is cooperative. No distress.   HENT: "   Head: Normocephalic and atraumatic.   Neck: Normal range of motion. Neck supple. No thyromegaly present.   Cardiovascular: Normal rate, regular rhythm and normal heart sounds.    Pulmonary/Chest: Effort normal and breath sounds normal. He has no wheezes. He has no rhonchi. He has no rales.   Abdominal: Soft. Normal appearance and bowel sounds are normal. He exhibits no distension. There is no hepatosplenomegaly. There is tenderness in the epigastric area. There is no rigidity and no guarding. No hernia.   Lymphadenopathy:     He has no cervical adenopathy.   Neurological: He is alert and oriented to person, place, and time.   Skin: Skin is warm, dry and intact. No rash noted. No pallor.   Psychiatric: He has a normal mood and affect. His speech is normal.     Lab on 03/19/2018   Component Date Value Ref Range Status   • IgA 03/21/2018 180  90 - 386 mg/dL Final   • Gliadin Deamidated Peptide Ab, IgA 03/21/2018 4  0 - 19 units Final   • Deaminated Gliadin Ab IgG 03/21/2018 2  0 - 19 units Final   • Tissue Transglutaminase IgA 03/21/2018 <2  0 - 3 U/mL Final   • Tissue Transglutaminase IgG 03/21/2018 <2  0 - 5 U/mL Final   • Class Description 03/25/2018 Comment   Final   • Egg White 03/25/2018 <0.10  Class 0 kU/L Final   • Milk, Cow's 03/25/2018 0.28* Class 0/I kU/L Final   • CodFish 03/25/2018 <0.10  Class 0 kU/L Final   • Sesame Seed 03/25/2018 <0.10  Class 0 kU/L Final   • Peanut 03/25/2018 0.28* Class 0/I kU/L Final   • Soybean 03/25/2018 0.11* Class 0/I kU/L Final   • Hazelnut 03/25/2018 <0.10  Class 0 kU/L Final   • Shrimp 03/25/2018 <0.10  Class 0 kU/L Final   • [place]allop 03/25/2018 <0.10  Class 0 kU/L Final   • Gluten 03/25/2018 0.13* Class 0/I kU/L Final   • [place] 03/25/2018 0.13* Class 0/I kU/L Final   • Wheat 03/25/2018 0.19* Class 0/I kU/L Final   • Gliadin Deamidated Peptide Ab, IgA 03/21/2018 3  0 - 19 units Final   • Deaminated Gliadin Ab IgG 03/21/2018 2  0 - 19 units Final   • Tissue Transglutaminase  IgA 03/21/2018 <2  0 - 3 U/mL Final   • Tissue Transglutaminase IgG 03/21/2018 <2  0 - 5 U/mL Final     Assessment/Plan      1. Celiac disease    2. Epigastric pain    .       Orders placed during this encounter include:  Orders Placed This Encounter   Procedures   • Ambulatory Referral to Nutrition Services     Referral Priority:   Routine     Referral Type:   Health Education     Referral Reason:   Specialty Services Required     Requested Specialty:   Nutrition     Number of Visits Requested:   1       * Surgery not found *    Review and/or summary of lab tests, radiology, procedures, medications. Review and summary of old records and obtaining of history. The risks and benefits of my recommendations, as well as other treatment options were discussed with the patient today. Questions were answered.    No orders of the defined types were placed in this encounter.      Follow-up: Return in about 4 weeks (around 4/24/2018).          This document has been electronically signed by MERLINE Nelson on March 27, 2018 4:25 PM             Results for orders placed or performed in visit on 03/19/18   Celiac Ab tTG DGP TIgA   Result Value Ref Range    IgA 180 90 - 386 mg/dL    Gliadin Deamidated Peptide Ab, IgA 4 0 - 19 units    Deaminated Gliadin Ab IgG 2 0 - 19 units    Tissue Transglutaminase IgA <2 0 - 3 U/mL    Tissue Transglutaminase IgG <2 0 - 5 U/mL   Glia(IgA / G) & TTG(IgA / G)   Result Value Ref Range    Gliadin Deamidated Peptide Ab, IgA 3 0 - 19 units    Deaminated Gliadin Ab IgG 2 0 - 19 units    Tissue Transglutaminase IgA <2 0 - 3 U/mL    Tissue Transglutaminase IgG <2 0 - 5 U/mL   Allergens (12) Foods   Result Value Ref Range    Class Description Comment     Egg White <0.10 Class 0 kU/L    Milk, Cow's 0.28 (A) Class 0/I kU/L    CodFish <0.10 Class 0 kU/L    Sesame Seed <0.10 Class 0 kU/L    Peanut 0.28 (A) Class 0/I kU/L    Soybean 0.11 (A) Class 0/I kU/L    Hazelnut <0.10 Class 0 kU/L    Shrimp <0.10  Class 0 kU/L    Scallop <0.10 Class 0 kU/L    Gluten 0.13 (A) Class 0/I kU/L    [place] 0.13 (A) Class 0/I kU/L    Wheat 0.19 (A) Class 0/I kU/L   Results for orders placed or performed during the hospital encounter of 03/15/18   Tissue Pathology Exam   Result Value Ref Range    Case Report       Surgical Pathology Report                         Case: [id number]                                  Authorizing Provider:  Roman Boyle MD         Collected:           03/15/2018 06:06 PM          Ordering Location:     [facility]             Received:            03/16/2018 07:56 AM                                 [facility]                                                     Pathologist:           Surinder Andrea MD                                                            Specimens:   1) - Small Intestine, Duodenum                                                                      2) - Gastric, Antrum                                                                                3) - Esophagus, Distal                                                                     Final Diagnosis       1.  SMALL BOWEL, BIOPSY:  NORMAL VILLOUS ARCHITECTURE WITH INCREASED INTRAEPITHELIAL LYMPHOCYTES (MARSH TYPE 1 LESION).  CONSIDER CELIAC SPRUE VERSUS OTHER CAUSE (OTHER PROTEIN ALLERGY, CROHN'S DISEASE, PARASITES, H. PYLORI, AUTOIMMUNE ENTEROPATHY, AND OTHERS)    2.  GASTRIC ANTRUM, BIOPSY:  REACTIVE GASTROPATHY.    3.  DISTAL ESOPHAGUS, BIOPSY:  CHRONIC ESOPHAGITIS WITH INCREASED INTRAEPITHELIAL EOSINOPHILS.  CONSIDER REFLUX ESOPHAGITIS (FAVOR) VERUS EOSINOPHILIC ESOPHAGITIS.      Gross Description       1.  A tan fragment measures 0.5 x 0.4 x 0.3 cm.  Totally submitted.    2.  A tan fragment measures 0.6 x 0.3 x 0.2 cm.  Totally submitted.     3.  Two gray fragments measure 0.8 x 0.4 x 0.1 cm together.  Totally submitted.           Embedded Images     Results for orders placed or performed during the hospital  encounter of 03/12/18   Helicobacter Pylori, IgA IgG IgM   Result Value Ref Range    H. pylori IgG 0.16 0.00 - 0.79 Index Value    H. pylori, IgA ABS <9.0 0.0 - 8.9 units    H. Pylori, IgM <9.0 0.0 - 8.9 units   CBC Auto Differential   Result Value Ref Range    WBC 4.88 3.20 - 9.80 10*3/mm3    RBC 5.17 4.37 - 5.74 10*6/mm3    Hemoglobin 15.3 13.7 - 17.3 g/dL    Hematocrit 43.5 39.0 - 49.0 %    MCV 84.1 80.0 - 98.0 fL    MCH 29.6 26.5 - 34.0 pg    MCHC 35.2 31.5 - 36.3 g/dL    RDW 12.0 11.5 - 14.5 %    RDW-SD 37.0 35.1 - 43.9 fl    MPV 9.9 8.0 - 12.0 fL    Platelets 265 150 - 450 10*3/mm3    Neutrophil % 46.3 37.0 - 80.0 %    Lymphocyte % 44.1 10.0 - 50.0 %    Monocyte % 7.4 0.0 - 12.0 %    Eosinophil % 1.6 0.0 - 7.0 %    Basophil % 0.6 0.0 - 2.0 %    Immature Grans % 0.0 0.0 - 0.5 %    Neutrophils, Absolute 2.26 2.00 - 8.60 10*3/mm3    Lymphocytes, Absolute 2.15 0.60 - 4.20 10*3/mm3    Monocytes, Absolute 0.36 0.00 - 0.90 10*3/mm3    Eosinophils, Absolute 0.08 0.00 - 0.70 10*3/mm3    Basophils, Absolute 0.03 0.00 - 0.20 10*3/mm3    Immature Grans, Absolute 0.00 0.00 - 0.02 10*3/mm3   Comprehensive Metabolic Panel   Result Value Ref Range    Glucose 85 60 - 100 mg/dL    BUN 9 7 - 21 mg/dL    Creatinine 0.81 0.70 - 1.30 mg/dL    Sodium 141 137 - 145 mmol/L    Potassium 4.5 3.5 - 5.1 mmol/L    Chloride 99 95 - 110 mmol/L    CO2 29.0 22.0 - 31.0 mmol/L    Calcium 10.0 8.4 - 10.2 mg/dL    Total Protein 7.9 6.3 - 8.6 g/dL    Albumin 4.70 3.40 - 4.80 g/dL    ALT (SGPT) 46 21 - 72 U/L    AST (SGOT) 27 17 - 59 U/L    Alkaline Phosphatase 69 38 - 126 U/L    Total Bilirubin 0.6 0.2 - 1.3 mg/dL    eGFR Non African Amer 120 >60 mL/min/1.73    Globulin 3.2 2.3 - 3.5 gm/dL    A/G Ratio 1.5 1.1 - 1.8 g/dL    BUN/Creatinine Ratio 11.1 7.0 - 25.0    Anion Gap 13.0 5.0 - 15.0 mmol/L   Results for orders placed or performed during the hospital encounter of 01/16/17   Urinalysis   Result Value Ref Range    Color, UA YELLOW  STRAW,YELLOW,DK YELLOW,JUANI    Appearance CLOUDY (H) CLEAR    Specific Gravity, UA 1.011 1.003 - 1.030    pH, UA 7.5 5.0 - 9.0 pH Units    Leukocytes, UA NEGATIVE NEGATIVE    Nitrite, UA NEGATIVE NEGATIVE    Protein, UA NEGATIVE NEGATIVE    Glucose, Urine NEGATIVE NEGATIVE mg/dl    Ketones, UA NEGATIVE NEGATIVE    Urobilinogen, UA 0.2 0.2 EU/dl    Blood, UA NEGATIVE NEGATIVE   CBC & Differential   Result Value Ref Range    WBC 12.6 (H) 3.2 - 9.8 x1000/uL    RBC 5.23 4.37 - 5.74 jayla/mm3    Hemoglobin 15.4 13.7 - 17.3 gm/dl    Hematocrit 42.9 39.0 - 49.0 %    MCV 82.0 80.0 - 98.0 fl    MCH 29.4 26.0 - 34.0 pg    MCHC 35.9 31.5 - 36.3 gm/dl    RDW 12.0 11.5 - 14.5 %    Platelets 319 150 - 450 x1000/mm3    MPV 9.1 8.0 - 12.0 fl    Neutrophil Rel % 76.7 37.0 - 80.0 %    Lymphocyte Rel % 16.8 10.0 - 50.0 %    Monocyte Rel % 5.5 0.0 - 12.0 %    Eosinophil Rel % 0.6 0.0 - 7.0 %    Basophil Rel % 0.2 0.0 - 2.0 %    Immature Granulocyte Rel % 0.20 0.00 - 0.50 %    Neutrophils Absolute 9.69 (H) 2.00 - 8.60 x1000/uL    Lymphocytes Absolute 2.12 0.60 - 4.20 x1000/uL    Monocytes Absolute 0.69 0.00 - 0.90 x1000/uL    Eosinophils Absolute 0.07 0.00 - 0.70 x1000/uL    Basophils Absolute 0.02 0.00 - 0.20 x1000/uL    Immature Granulocytes Absolute 0.030 (H) 0.005 - 0.022 x1000/uL    nRBC 0.0 0.0 - 0.2 %    nRBC 0.000 x1000/uL     *Note: Due to a large number of results and/or encounters for the requested time period, some results have not been displayed. A complete set of results can be found in Results Review.

## 2018-04-13 ENCOUNTER — DOCUMENTATION (OUTPATIENT)
Dept: NUTRITION | Facility: HOSPITAL | Age: 23
End: 2018-04-13

## 2018-04-13 NOTE — PROGRESS NOTES
Nutrition Services    Patient Name:  Aldair Rivera  YOB: 1995  MRN: [id number]  Admit Date:  (Not on file)    Pt was referred for celiac disease by asael high.pt's insurance doesn't pay for Medical Nutrition Therapy. He may call back for an appt or to join our celiac support group. This RDN will follow as needed.    Electronically signed by:  Reva Rushing RD  04/13/18 11:52 AM

## 2018-04-27 ENCOUNTER — OFFICE VISIT (OUTPATIENT)
Dept: GASTROENTEROLOGY | Facility: CLINIC | Age: 23
End: 2018-04-27

## 2018-04-27 VITALS
HEIGHT: 72 IN | SYSTOLIC BLOOD PRESSURE: 120 MMHG | WEIGHT: 154.8 LBS | HEART RATE: 63 BPM | DIASTOLIC BLOOD PRESSURE: 78 MMHG | BODY MASS INDEX: 20.97 KG/M2

## 2018-04-27 DIAGNOSIS — R63.4 WEIGHT LOSS: ICD-10-CM

## 2018-04-27 DIAGNOSIS — K90.0 CELIAC DISEASE: Primary | ICD-10-CM

## 2018-04-27 DIAGNOSIS — K21.00 GASTROESOPHAGEAL REFLUX DISEASE WITH ESOPHAGITIS: ICD-10-CM

## 2018-04-27 PROCEDURE — 99213 OFFICE O/P EST LOW 20 MIN: CPT | Performed by: NURSE PRACTITIONER

## 2018-05-22 DIAGNOSIS — F41.9 ANXIETY AND DEPRESSION: ICD-10-CM

## 2018-05-22 DIAGNOSIS — F32.A ANXIETY AND DEPRESSION: ICD-10-CM

## 2018-05-29 RX ORDER — SERTRALINE HYDROCHLORIDE 25 MG/1
25 TABLET, FILM COATED ORAL DAILY
Qty: 30 TABLET | Refills: 0 | Status: SHIPPED | OUTPATIENT
Start: 2018-05-29 | End: 2018-10-11

## 2018-09-25 ENCOUNTER — HOSPITAL ENCOUNTER (EMERGENCY)
Facility: HOSPITAL | Age: 23
Discharge: HOME | End: 2018-09-26
Attending: EMERGENCY MEDICINE | Admitting: EMERGENCY MEDICINE

## 2018-09-25 DIAGNOSIS — S16.1XXA STRAIN OF NECK MUSCLE, INITIAL ENCOUNTER: Primary | ICD-10-CM
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
## 2018-09-25 DIAGNOSIS — S39.012A STRAIN OF LUMBAR REGION, INITIAL ENCOUNTER: ICD-10-CM

## 2018-09-25 PROCEDURE — 99284 EMERGENCY DEPT VISIT MOD MDM: CPT

## 2018-09-26 ENCOUNTER — APPOINTMENT (OUTPATIENT)
Dept: GENERAL RADIOLOGY | Facility: HOSPITAL | Age: 23
End: 2018-09-26

## 2018-09-26 ENCOUNTER — APPOINTMENT (OUTPATIENT)
Dept: CT IMAGING | Facility: HOSPITAL | Age: 23
End: 2018-09-26

## 2018-09-26 VITALS
HEIGHT: 72 IN | WEIGHT: 165 LBS | OXYGEN SATURATION: 98 % | TEMPERATURE: 98.6 F | RESPIRATION RATE: 16 BRPM | DIASTOLIC BLOOD PRESSURE: 68 MMHG | BODY MASS INDEX: 22.35 KG/M2 | SYSTOLIC BLOOD PRESSURE: 118 MMHG | HEART RATE: 68 BPM

## 2018-09-26 PROCEDURE — 71045 X-RAY EXAM CHEST 1 VIEW: CPT

## 2018-09-26 PROCEDURE — 72100 X-RAY EXAM L-S SPINE 2/3 VWS: CPT

## 2018-09-26 PROCEDURE — 73502 X-RAY EXAM HIP UNI 2-3 VIEWS: CPT

## 2018-09-26 PROCEDURE — 72125 CT NECK SPINE W/O DYE: CPT

## 2018-09-26 PROCEDURE — 25010000002 KETOROLAC TROMETHAMINE PER 15 MG: Performed by: EMERGENCY MEDICINE

## 2018-09-26 PROCEDURE — 96374 THER/PROPH/DIAG INJ IV PUSH: CPT

## 2018-09-26 PROCEDURE — 70450 CT HEAD/BRAIN W/O DYE: CPT

## 2018-09-26 RX ORDER — KETOROLAC TROMETHAMINE 30 MG/ML
30 INJECTION, SOLUTION INTRAMUSCULAR; INTRAVENOUS ONCE
Status: COMPLETED | OUTPATIENT
Start: 2018-09-26 | End: 2018-09-26

## 2018-09-26 RX ORDER — IBUPROFEN 600 MG/1
600 TABLET ORAL EVERY 6 HOURS PRN
Qty: 30 TABLET | Refills: 0 | Status: SHIPPED | OUTPATIENT
Start: 2018-09-26

## 2018-09-26 RX ORDER — METHOCARBAMOL 750 MG/1
750 TABLET, FILM COATED ORAL 3 TIMES DAILY
Qty: 30 TABLET | Refills: 0 | Status: SHIPPED | OUTPATIENT
Start: 2018-09-26

## 2018-09-26 RX ADMIN — KETOROLAC TROMETHAMINE 30 MG: 30 INJECTION, SOLUTION INTRAMUSCULAR; INTRAVENOUS at 00:59

## 2018-09-26 NOTE — DISCHARGE INSTRUCTIONS
Take pain medications and muscle relaxant as needed.  Follow-up with primary care for reevaluation.  Return to ER for worsening.
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
## 2018-10-11 ENCOUNTER — OFFICE VISIT (OUTPATIENT)
Dept: FAMILY MEDICINE CLINIC | Facility: CLINIC | Age: 23
End: 2018-10-11

## 2018-10-11 VITALS
BODY MASS INDEX: 22.95 KG/M2 | HEIGHT: 72 IN | SYSTOLIC BLOOD PRESSURE: 116 MMHG | DIASTOLIC BLOOD PRESSURE: 66 MMHG | WEIGHT: 169.44 LBS

## 2018-10-11 DIAGNOSIS — F41.9 ANXIETY AND DEPRESSION: Primary | ICD-10-CM

## 2018-10-11 DIAGNOSIS — F32.A ANXIETY AND DEPRESSION: Primary | ICD-10-CM

## 2018-10-11 PROCEDURE — 99213 OFFICE O/P EST LOW 20 MIN: CPT | Performed by: FAMILY MEDICINE

## 2018-10-11 RX ORDER — BUSPIRONE HYDROCHLORIDE 5 MG/1
5 TABLET ORAL 2 TIMES DAILY
Qty: 60 TABLET | Refills: 1 | Status: SHIPPED | OUTPATIENT
Start: 2018-10-11

## 2018-10-11 NOTE — PROGRESS NOTES
Subjective:     Aldair Rivera is a 22 y.o. male who presents for follow up for anxiety. Pt reports she has run out of her zoloft 1 month ago. Pt only took vistaril previously 1x and decided he didn't like it. Pt reports he is not feeling depressed over the last month but is still having anxiety.  Pt reports no difficulty falling or staying asleep, good interest in hobbies, no feelings of guilt, low energy, good concentration, normal appetite, no psychomotor retardation, no homicidal/suicidal ideation. Pt reports significant improvement in mood since he switched jobs about a month ago. Pt is agreeable to stopping vistaril and zoloft and starting buspar.    Preventative:  Over the past 2 weeks, have you felt down, depressed, or hopeless?No   Over the past 2 weeks, have you felt little interest or pleasure in doing things?No  Clinical depression screening refused by patient.No     Past Medical Hx:  Past Medical History:   Diagnosis Date   • Backache    • Celiac disease    • Depression    • Neck pain        Past Surgical Hx:  Past Surgical History:   Procedure Laterality Date   • DENTAL PROCEDURE     • ENDOSCOPY N/A 3/15/2018    Procedure: ESOPHAGOGASTRODUODENOSCOPY possible dilation;  Surgeon: Roman Boyle MD;  Location: [facility] ENDOSCOPY;  Service: Gastroenterology   • INJECTION OF MEDICATION  04/07/2016    Toradol (1)          Health Maintenance:  Health Maintenance   Topic Date Due   • ANNUAL PHYSICAL  12/29/1999   • MENINGOCOCCAL VACCINE (Normal Risk) (1 of 1 - 2-dose series) 12/29/2012   • INFLUENZA VACCINE  08/01/2018   • HPV VACCINES (2 of 2 - Male 2-dose series) 09/20/2018   • TDAP/TD VACCINES (1 - Tdap) 03/14/2019 (Originally 12/29/2015)       Current Meds:    Current Outpatient Prescriptions:   •  ibuprofen (ADVIL,MOTRIN) 600 MG tablet, Take 1 tablet by mouth Every 6 (Six) Hours As Needed for Moderate Pain ., Disp: 30 tablet, Rfl: 0  •  loratadine (CLARITIN) 10 MG tablet, Take 1 tablet by mouth Daily.,  Disp: 14 tablet, Rfl: 0  •  methocarbamol (ROBAXIN) 750 MG tablet, Take 1 tablet by mouth 3 (Three) Times a Day., Disp: 30 tablet, Rfl: 0  •  ondansetron ODT (ZOFRAN-ODT) 8 MG disintegrating tablet, Take 1 tablet by mouth Every 8 (Eight) Hours As Needed for Nausea or Vomiting., Disp: 21 tablet, Rfl: 0  •  sucralfate (CARAFATE) 1 g tablet, Take 1 tablet by mouth 4 (Four) Times a Day., Disp: 120 tablet, Rfl: 2  •  busPIRone (BUSPAR) 5 MG tablet, Take 1 tablet by mouth 2 (Two) Times a Day., Disp: 60 tablet, Rfl: 1    Allergies:  Zithromax [azithromycin]    Family Hx:  Family History   Problem Relation Age of Onset   • Heart disease Mother    • Hypertension Mother    • Stroke Maternal Aunt    • Diabetes Maternal Grandmother         Social History:  Social History     Social History   • Marital status: Single     Spouse name: N/A   • Number of children: N/A   • Years of education: N/A     Occupational History   • Not on file.     Social History Main Topics   • Smoking status: Former Smoker     Packs/day: 0.50     Years: 2.00     Types: Cigarettes   • Smokeless tobacco: Current User     Types: Chew   • Alcohol use No   • Drug use: No   • Sexual activity: Defer     Other Topics Concern   • Not on file     Social History Narrative   • No narrative on file       Review of Systems  Review of Systems   Constitutional: Negative for chills and fever.   HENT: Negative for congestion and sore throat.    Eyes: Negative for photophobia and visual disturbance.   Respiratory: Negative for cough, shortness of breath and wheezing.    Cardiovascular: Negative for chest pain, palpitations and leg swelling.   Gastrointestinal: Negative for abdominal pain, diarrhea, nausea and vomiting.   Genitourinary: Negative for dysuria and hematuria.   Musculoskeletal: Negative for neck pain and neck stiffness.   Skin: Negative for rash and wound.   Neurological: Negative for seizures and syncope.   Psychiatric/Behavioral: Positive for dysphoric mood.  "The patient is nervous/anxious.          Objective:     /66 (BP Location: Left arm, Patient Position: Sitting, Cuff Size: Adult)   Ht 182.9 cm (72\")   Wt 76.9 kg (169 lb 7 oz)   BMI 22.98 kg/m²   Physical Exam   Constitutional: He is oriented to person, place, and time. He appears well-developed and well-nourished. No distress.   HENT:   Head: Normocephalic and atraumatic.   Right Ear: External ear normal.   Left Ear: External ear normal.   Nose: Nose normal.   Eyes: Conjunctivae are normal. Right eye exhibits no discharge. Left eye exhibits no discharge. No scleral icterus.   Neck: Normal range of motion. Neck supple.   Cardiovascular: Normal rate, regular rhythm and normal heart sounds.    No murmur heard.  Pulmonary/Chest: Effort normal and breath sounds normal. No respiratory distress. He has no wheezes. He has no rales.   Abdominal: Soft. Bowel sounds are normal. There is no tenderness.   Musculoskeletal: Normal range of motion. He exhibits no edema.   Neurological: He is alert and oriented to person, place, and time.   Skin: Skin is warm and dry. He is not diaphoretic.   Psychiatric: He has a normal mood and affect. His behavior is normal. Judgment and thought content normal.   Nursing note and vitals reviewed.                                                                     Assessment/Plan:     Diagnoses and all orders for this visit:    Anxiety and depression  -     busPIRone (BUSPAR) 5 MG tablet; Take 1 tablet by mouth 2 (Two) Times a Day.         Follow-up:     Return in about 4 weeks (around 11/8/2018) for Next scheduled follow up.        GOALS:  Maintain medication compliance    Preventative:  Male Preventative: Exercises regularly  Vaccines:   Annual influenza vaccine: not up to date - will obtain     former smoker  does not drink  eat more fruits and vegetables, decrease soda or juice intake, increase water intake and increase physical activity    RISK SCORE: 3    Mil Santizo MD " PGY2  [place] [address]  Office: [phone]      This document has been electronically signed by Mil Santizo MD on October 15, 2018 11:37 AM

## 2018-10-23 ENCOUNTER — TELEPHONE (OUTPATIENT)
Dept: FAMILY MEDICINE CLINIC | Facility: CLINIC | Age: 23
End: 2018-10-23

## 2018-10-23 NOTE — PROGRESS NOTES
I have seen the patient.  I have reviewed the notes, assessments, and/or procedures performed by the resident, I concur with her/his documentation and assessment and plan for Aldair Izquierdo Rivera.      This document has been electronically signed by Kenrick Prieto MD on October 23, 2018 5:17 PM

## 2018-10-23 NOTE — TELEPHONE ENCOUNTER
Tried to call pt to schedule NOV appt with Dr. Santizo. First number ([phone]) was not reachable, second number ([phone]) was disconnected.

## 2023-08-31 DIAGNOSIS — Z31.69 INFERTILITY COUNSELING: Primary | ICD-10-CM

## (undated) DEVICE — CANN SMPL SOFTECH BIFLO ETCO2 A/M 7FT

## (undated) DEVICE — SINGLE-USE BIOPSY FORCEPS: Brand: RADIAL JAW 4